# Patient Record
Sex: FEMALE | URBAN - METROPOLITAN AREA
[De-identification: names, ages, dates, MRNs, and addresses within clinical notes are randomized per-mention and may not be internally consistent; named-entity substitution may affect disease eponyms.]

---

## 2024-10-23 ENCOUNTER — APPOINTMENT (RX ONLY)
Dept: URBAN - METROPOLITAN AREA CLINIC 93 | Facility: CLINIC | Age: 89
Setting detail: DERMATOLOGY
End: 2024-10-23

## 2024-10-23 PROBLEM — C44.1192 BASAL CELL CARCINOMA OF SKIN OF LEFT LOWER EYELID, INCLUDING CANTHUS: Status: ACTIVE | Noted: 2024-10-23

## 2024-10-23 PROCEDURE — ? CONSULTATION FOR MOHS SURGERY

## 2024-10-23 PROCEDURE — 99204 OFFICE O/P NEW MOD 45 MIN: CPT

## 2024-10-23 NOTE — PROCEDURE: CONSULTATION FOR MOHS SURGERY
Detail Level: Detailed
Body Location Override (Optional - Billing Will Still Be Based On Selected Body Map Location If Applicable): left lower eyelid
X Size Of Lesion In Cm (Optional): 0
Other Plan: Discussed treatment options including Mohs (would do this as a moat procedure) with oculoplastics recon. This would be an extensive surgery/recon and would require general anesthesia, which is a concern given her age. There is likely invasion of lacrimal system given significant tearing of eye, which is new over the past few months. Ectropion worsening as well. 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 She also has a fib and is on eliquis, which is a concern for surgical approach. She is meeting with rad onc tomorrow to discuss this option. They are ordering MRI to assess extent of tumor. I will discuss case with Dr. Avina (oculoplastics) and give pt a call tomorrow after her rad onc visit to discuss further. I do not think vismodegib is a good option given age and medical problems, risk of significant fatigue, loss of taste leading to weight loss. 
Incorporate Mauc In Note: Yes
Mauc Override (Will Disregard Factors Selected Below): 9 (Appropriate)